# Patient Record
Sex: FEMALE | Employment: FULL TIME | URBAN - NONMETROPOLITAN AREA
[De-identification: names, ages, dates, MRNs, and addresses within clinical notes are randomized per-mention and may not be internally consistent; named-entity substitution may affect disease eponyms.]

---

## 2023-04-20 ENCOUNTER — ANESTHESIA EVENT (OUTPATIENT)
Age: 33
End: 2023-04-20
Payer: OTHER GOVERNMENT

## 2023-05-05 ENCOUNTER — ANESTHESIA (OUTPATIENT)
Age: 33
End: 2023-05-05
Payer: OTHER GOVERNMENT

## 2023-05-05 ENCOUNTER — APPOINTMENT (OUTPATIENT)
Age: 33
End: 2023-05-05
Attending: PODIATRIST
Payer: OTHER GOVERNMENT

## 2023-05-05 ENCOUNTER — HOSPITAL ENCOUNTER (OUTPATIENT)
Age: 33
Setting detail: OUTPATIENT SURGERY
Discharge: HOME | End: 2023-05-05
Attending: PODIATRIST | Admitting: PODIATRIST
Payer: OTHER GOVERNMENT

## 2023-05-05 VITALS
HEIGHT: 67 IN | HEART RATE: 76 BPM | RESPIRATION RATE: 16 BRPM | DIASTOLIC BLOOD PRESSURE: 75 MMHG | TEMPERATURE: 98 F | SYSTOLIC BLOOD PRESSURE: 122 MMHG | WEIGHT: 206 LBS | OXYGEN SATURATION: 99 % | BODY MASS INDEX: 32.33 KG/M2

## 2023-05-05 DIAGNOSIS — G89.18 POST-OP PAIN: Primary | ICD-10-CM

## 2023-05-05 PROCEDURE — C1769 GUIDE WIRE: HCPCS | Performed by: PODIATRIST

## 2023-05-05 PROCEDURE — 6370000000 HC RX 637 (ALT 250 FOR IP): Performed by: NURSE ANESTHETIST, CERTIFIED REGISTERED

## 2023-05-05 PROCEDURE — 73620 X-RAY EXAM OF FOOT: CPT

## 2023-05-05 PROCEDURE — 3600000014 HC SURGERY LEVEL 4 ADDTL 15MIN: Performed by: PODIATRIST

## 2023-05-05 PROCEDURE — 2500000003 HC RX 250 WO HCPCS: Performed by: NURSE ANESTHETIST, CERTIFIED REGISTERED

## 2023-05-05 PROCEDURE — 7100000011 HC PHASE II RECOVERY - ADDTL 15 MIN: Performed by: PODIATRIST

## 2023-05-05 PROCEDURE — 2580000003 HC RX 258: Performed by: PODIATRIST

## 2023-05-05 PROCEDURE — 97161 PT EVAL LOW COMPLEX 20 MIN: CPT

## 2023-05-05 PROCEDURE — 2709999900 HC NON-CHARGEABLE SUPPLY: Performed by: PODIATRIST

## 2023-05-05 PROCEDURE — 6360000002 HC RX W HCPCS: Performed by: NURSE ANESTHETIST, CERTIFIED REGISTERED

## 2023-05-05 PROCEDURE — 3700000001 HC ADD 15 MINUTES (ANESTHESIA): Performed by: PODIATRIST

## 2023-05-05 PROCEDURE — 2500000003 HC RX 250 WO HCPCS: Performed by: PODIATRIST

## 2023-05-05 PROCEDURE — 2580000003 HC RX 258: Performed by: NURSE ANESTHETIST, CERTIFIED REGISTERED

## 2023-05-05 PROCEDURE — 3600000004 HC SURGERY LEVEL 4 BASE: Performed by: PODIATRIST

## 2023-05-05 PROCEDURE — 3700000000 HC ANESTHESIA ATTENDED CARE: Performed by: PODIATRIST

## 2023-05-05 PROCEDURE — 2720000010 HC SURG SUPPLY STERILE: Performed by: PODIATRIST

## 2023-05-05 PROCEDURE — 6360000002 HC RX W HCPCS: Performed by: PODIATRIST

## 2023-05-05 PROCEDURE — 7100000010 HC PHASE II RECOVERY - FIRST 15 MIN: Performed by: PODIATRIST

## 2023-05-05 PROCEDURE — C1713 ANCHOR/SCREW BN/BN,TIS/BN: HCPCS | Performed by: PODIATRIST

## 2023-05-05 PROCEDURE — 7100000000 HC PACU RECOVERY - FIRST 15 MIN: Performed by: PODIATRIST

## 2023-05-05 PROCEDURE — 7100000001 HC PACU RECOVERY - ADDTL 15 MIN: Performed by: PODIATRIST

## 2023-05-05 DEVICE — LOCKING SCREWS
Type: IMPLANTABLE DEVICE | Site: FOOT | Status: FUNCTIONAL
Brand: FASTPITCH 2.7MM HIGH PITCH LOCKING SCREW

## 2023-05-05 DEVICE — ANATOMIC TENSION SIDE PLATE
Type: IMPLANTABLE DEVICE | Site: FOOT | Status: FUNCTIONAL
Brand: PLANTARPOWER PLATE

## 2023-05-05 DEVICE — 2.7MM HIGH PITCH LOCKING SCREW - 12MM/14MM
Type: IMPLANTABLE DEVICE | Site: FOOT | Status: FUNCTIONAL
Brand: FASTPITCH

## 2023-05-05 DEVICE — ANATOMIC BIPLANAR IMPLANTS
Type: IMPLANTABLE DEVICE | Site: FOOT | Status: FUNCTIONAL
Brand: LAPIPLASTY SYSTEM 2

## 2023-05-05 DEVICE — IMPLANTABLE DEVICE: Type: IMPLANTABLE DEVICE | Site: FOOT | Status: FUNCTIONAL

## 2023-05-05 DEVICE — DYNAFORCE IS A NITINOL SUPERELASTIC BONE STAPLE FOR USE IN SURGERY OF THE HAND AND FOOT FOR BONE FRAGMENT OSTEOTOMY FIXATION AND JOINT ARTHRODESIS
Type: IMPLANTABLE DEVICE | Site: FOOT | Status: FUNCTIONAL
Brand: DYNAFORCE™

## 2023-05-05 RX ORDER — DEXAMETHASONE SODIUM PHOSPHATE 10 MG/ML
4 INJECTION, SOLUTION INTRAMUSCULAR; INTRAVENOUS
Status: DISCONTINUED | OUTPATIENT
Start: 2023-05-05 | End: 2023-05-05 | Stop reason: HOSPADM

## 2023-05-05 RX ORDER — SODIUM CHLORIDE 0.9 % (FLUSH) 0.9 %
5-40 SYRINGE (ML) INJECTION EVERY 12 HOURS SCHEDULED
Status: DISCONTINUED | OUTPATIENT
Start: 2023-05-05 | End: 2023-05-05 | Stop reason: HOSPADM

## 2023-05-05 RX ORDER — OXYCODONE HYDROCHLORIDE AND ACETAMINOPHEN 5; 325 MG/1; MG/1
1 TABLET ORAL EVERY 6 HOURS PRN
Qty: 15 TABLET | Refills: 0 | OUTPATIENT
Start: 2023-05-05 | End: 2023-05-10

## 2023-05-05 RX ORDER — OXYCODONE HYDROCHLORIDE AND ACETAMINOPHEN 5; 325 MG/1; MG/1
1 TABLET ORAL ONCE
Status: COMPLETED | OUTPATIENT
Start: 2023-05-05 | End: 2023-05-05

## 2023-05-05 RX ORDER — KETOROLAC TROMETHAMINE 30 MG/ML
INJECTION, SOLUTION INTRAMUSCULAR; INTRAVENOUS PRN
Status: DISCONTINUED | OUTPATIENT
Start: 2023-05-05 | End: 2023-05-05 | Stop reason: SDUPTHER

## 2023-05-05 RX ORDER — AMOXICILLIN AND CLAVULANATE POTASSIUM 875; 125 MG/1; MG/1
1 TABLET, FILM COATED ORAL 2 TIMES DAILY
Qty: 20 TABLET | Refills: 0 | OUTPATIENT
Start: 2023-05-05 | End: 2023-05-15

## 2023-05-05 RX ORDER — BUPIVACAINE HYDROCHLORIDE 5 MG/ML
INJECTION, SOLUTION EPIDURAL; INTRACAUDAL PRN
Status: DISCONTINUED | OUTPATIENT
Start: 2023-05-05 | End: 2023-05-05 | Stop reason: ALTCHOICE

## 2023-05-05 RX ORDER — SODIUM CHLORIDE, SODIUM LACTATE, POTASSIUM CHLORIDE, CALCIUM CHLORIDE 600; 310; 30; 20 MG/100ML; MG/100ML; MG/100ML; MG/100ML
INJECTION, SOLUTION INTRAVENOUS CONTINUOUS
Status: DISCONTINUED | OUTPATIENT
Start: 2023-05-05 | End: 2023-05-05 | Stop reason: HOSPADM

## 2023-05-05 RX ORDER — ONDANSETRON 2 MG/ML
INJECTION INTRAMUSCULAR; INTRAVENOUS PRN
Status: DISCONTINUED | OUTPATIENT
Start: 2023-05-05 | End: 2023-05-05 | Stop reason: SDUPTHER

## 2023-05-05 RX ORDER — ASPIRIN 325 MG
325 TABLET, DELAYED RELEASE (ENTERIC COATED) ORAL DAILY
Qty: 21 TABLET | Refills: 0 | OUTPATIENT
Start: 2023-05-05 | End: 2023-05-26

## 2023-05-05 RX ORDER — EPHEDRINE SULFATE/0.9% NACL/PF 50 MG/5 ML
SYRINGE (ML) INTRAVENOUS PRN
Status: DISCONTINUED | OUTPATIENT
Start: 2023-05-05 | End: 2023-05-05 | Stop reason: SDUPTHER

## 2023-05-05 RX ORDER — FENTANYL CITRATE 50 UG/ML
50 INJECTION, SOLUTION INTRAMUSCULAR; INTRAVENOUS EVERY 5 MIN PRN
Status: DISCONTINUED | OUTPATIENT
Start: 2023-05-05 | End: 2023-05-05 | Stop reason: HOSPADM

## 2023-05-05 RX ORDER — PROPOFOL 10 MG/ML
INJECTION, EMULSION INTRAVENOUS PRN
Status: DISCONTINUED | OUTPATIENT
Start: 2023-05-05 | End: 2023-05-05 | Stop reason: SDUPTHER

## 2023-05-05 RX ORDER — ONDANSETRON 2 MG/ML
4 INJECTION INTRAMUSCULAR; INTRAVENOUS
Status: DISCONTINUED | OUTPATIENT
Start: 2023-05-05 | End: 2023-05-05 | Stop reason: HOSPADM

## 2023-05-05 RX ADMIN — CEFAZOLIN 2000 MG: 2 INJECTION, POWDER, FOR SOLUTION INTRAMUSCULAR; INTRAVENOUS at 08:26

## 2023-05-05 RX ADMIN — ONDANSETRON 4 MG: 2 INJECTION INTRAMUSCULAR; INTRAVENOUS at 08:26

## 2023-05-05 RX ADMIN — KETOROLAC TROMETHAMINE 30 MG: 30 INJECTION, SOLUTION INTRAMUSCULAR at 10:46

## 2023-05-05 RX ADMIN — OXYCODONE AND ACETAMINOPHEN 1 TABLET: 5; 325 TABLET ORAL at 12:28

## 2023-05-05 RX ADMIN — SODIUM CHLORIDE, POTASSIUM CHLORIDE, SODIUM LACTATE AND CALCIUM CHLORIDE: 600; 310; 30; 20 INJECTION, SOLUTION INTRAVENOUS at 08:20

## 2023-05-05 RX ADMIN — SODIUM CHLORIDE, POTASSIUM CHLORIDE, SODIUM LACTATE AND CALCIUM CHLORIDE: 600; 310; 30; 20 INJECTION, SOLUTION INTRAVENOUS at 07:09

## 2023-05-05 RX ADMIN — PROPOFOL 200 MG: 10 INJECTION, EMULSION INTRAVENOUS at 08:24

## 2023-05-05 RX ADMIN — Medication 10 MG: at 08:39

## 2023-05-05 ASSESSMENT — PAIN SCALES - GENERAL
PAINLEVEL_OUTOF10: 4
PAINLEVEL_OUTOF10: 2
PAINLEVEL_OUTOF10: 4
PAINLEVEL_OUTOF10: 3

## 2023-05-05 ASSESSMENT — PAIN DESCRIPTION - PAIN TYPE: TYPE: SURGICAL PAIN

## 2023-05-05 ASSESSMENT — PAIN DESCRIPTION - DESCRIPTORS
DESCRIPTORS: ACHING

## 2023-05-05 ASSESSMENT — PAIN DESCRIPTION - ORIENTATION
ORIENTATION: LEFT

## 2023-05-05 ASSESSMENT — PAIN DESCRIPTION - LOCATION
LOCATION: FOOT

## 2023-05-05 ASSESSMENT — PAIN - FUNCTIONAL ASSESSMENT: PAIN_FUNCTIONAL_ASSESSMENT: NONE - DENIES PAIN

## 2023-05-05 ASSESSMENT — PAIN DESCRIPTION - FREQUENCY: FREQUENCY: CONTINUOUS

## 2023-05-05 NOTE — ANESTHESIA PRE PROCEDURE
Department of Anesthesiology  Preprocedure Note       Name:  Jatinder Hoff   Age:  27 y.o.  :  1990                                          MRN:  [id number]         Date:  2023      Surgeon: Geovany Layton):  Corrie Marte DPM    Procedure: Procedure(s):  LEFT FOOT: LAPIPLASTY, SECOND TOE ARTHROPLASTY, SECOND METATARSAL OSTEOTOMY, FIFTH, METATARSAL OSTEOTOMY, POSSIBLE AKIN OSTEOTOMY    Medications prior to admission:   Prior to Admission medications    Medication Sig Start Date End Date Taking? Authorizing Provider   MULTIPLE VITAMIN PO Take 1 tablet by mouth daily   Yes Historical Provider, MD       Current medications:    Current Facility-Administered Medications   Medication Dose Route Frequency Provider Last Rate Last Admin    lactated ringers IV soln infusion   IntraVENous Continuous JEFF Ferreira CRNA 125 mL/hr at 23 0709 New Bag at 23 0709    sodium chloride flush 0.9 % injection 5-40 mL  5-40 mL IntraVENous 2 times per day Flor Pineda APRN - CRNA           Allergies:  No Known Allergies    Problem List:  There is no problem list on file for this patient. Past Medical History:  History reviewed. No pertinent past medical history.     Past Surgical History:        Procedure Laterality Date     SECTION      WISDOM TOOTH EXTRACTION         Social History:    Social History     Tobacco Use    Smoking status: Never    Smokeless tobacco: Never   Substance Use Topics    Alcohol use: Not on file                                Counseling given: Not Answered      Vital Signs (Current):   Vitals:    23 1035 23 0702   BP:  (!) 102/56   Pulse:  66   Resp:  16   Temp:  36.7 °C (98.1 °F)   TempSrc:  Temporal   SpO2:  97%   Weight: 206 lb (93.4 kg)    Height: 5' 7\" (1.702 m)                                               BP Readings from Last 3 Encounters:   23 (!) 102/56       NPO Status: Time of last liquid consumption: 2200                        Time of

## 2023-05-05 NOTE — H&P
Update History & Physical    The patient's History and Physical of PCP was reviewed with the patient and I examined the patient. There was no change. The surgical site was confirmed by the patient and me. Plan: The risks, benefits, expected outcome, and alternative to the recommended procedure have been discussed with the patient. Patient understands and wants to proceed with the procedure.      Electronically signed by Tawanda Roberts DPM on 5/5/2023 at 8:16 AM

## 2023-05-05 NOTE — DISCHARGE INSTRUCTIONS
Keep dressings dry, clean, intact. Remain non-weight bearing to left foot. Ice and elevate left foot. Pain control as needed with percocet. Finish course of augmentin. Take aspirin 325 mg daily for 3 weeks.

## 2023-05-05 NOTE — THERAPY EVALUATION
PHYSICAL THERAPY EVALUATION AND DISCHARGE    Patient: Patrice Khan (33 y.o. female)  Date: 5/5/2023  Physical Therapy Time:   PT Individual Minutes  Time In: 8035  Time Out: 4567  Minutes: 23  Timed Minute Breakdown: All eval    Primary Diagnosis: Acquired hallux valgus of left foot [M20.12]  Hammer toe of left foot [M20.42]  Acquired cavovarus foot deformity, left [M21.6X2]  Tailor's bunionette, left [M21.622] <principal problem not specified>  Present on Admission:  **None**   Procedure(s) (LRB):  LEFT FOOT: LAPIPLASTY, SECOND TOE ARTHROPLASTY, SECOND METATARSAL OSTEOTOMY, FIFTH, METATARSAL OSTEOTOMY, POSSIBLE AKIN OSTEOTOMY (Left) Day of Surgery   Precautions:   Restrictions/Precautions  Restrictions/Precautions: Weight Bearing Lower Extremity Weight Bearing Restrictions  Left Lower Extremity Weight Bearing: Non Weight Bearing  WB Status: NWB LLE    Assessment: Pt is s/p L foot procedure and she is NWB. She performs mobility fairly well and tolerates crutches well. She is able to toilet with CGA and returns to bed. She is left with all needs met and no further questions. Performance Deficits/Impairments: Decreased functional mobility ; Decreased ROM; Decreased ADL status; Decreased strength;Decreased endurance;Decreased balance; Increased pain  Treatment Diagnosis: pain in L foot  Therapy Prognosis: Excellent  Decision Making: Low Complexity  Discharge Recommendations: Home with assist PRN    Conditions Requiring skilled therapeutic intervention:  Performance Deficits/Impairments: Decreased functional mobility ; Decreased ROM; Decreased ADL status; Decreased strength;Decreased endurance;Decreased balance; Increased pain     Evaluation Activity Tolerance:   Activity Tolerance  Activity Tolerance: Patient limited by fatigue    Equipment Recommendations for Discharge:  PT Equipment Recommendations  Crutches: Axillary    AM-PAC  AM-PAC Inpatient Mobility Raw Score : 20; Current research shows that an AM-PAC score of 17 or

## 2023-05-05 NOTE — ANESTHESIA POSTPROCEDURE EVALUATION
Department of Anesthesiology  Postprocedure Note    Patient: Crystal Doe  MRN: [id number]  YOB: 1990  Date of evaluation: 5/5/2023      Procedure Summary     Date: 05/05/23 Room / Location: [facility] MAIN 01 / [facility] MAIN OR    Anesthesia Start: 0820 Anesthesia Stop: 1134    Procedure: LEFT FOOT: LAPIPLASTY, SECOND TOE ARTHROPLASTY, SECOND METATARSAL OSTEOTOMY, FIFTH, METATARSAL OSTEOTOMY, POSSIBLE AKIN OSTEOTOMY (Left: Foot) Diagnosis:       Acquired hallux valgus of left foot      Hammer toe of left foot      Acquired cavovarus foot deformity, left      Tailor's bunionette, left      (Acquired hallux valgus of left foot [M20.12])      (Hammer toe of left foot [M20.42])      (Acquired cavovarus foot deformity, left [M21.6X2])      (Tailor's bunionette, left [M21.622])    Surgeons: Corby Shin DPM Responsible Provider: JEFF Canela CRNA    Anesthesia Type: General ASA Status: 2          Anesthesia Type: General    Lila Phase I: Lila Score: 10    Lila Phase II:        Anesthesia Post Evaluation    Patient location during evaluation: bedside  Patient participation: complete - patient participated  Level of consciousness: awake and alert  Pain score: 0  Airway patency: patent  Nausea & Vomiting: no nausea and no vomiting  Complications: no  Cardiovascular status: hemodynamically stable  Respiratory status: acceptable and room air  Hydration status: euvolemic  Multimodal analgesia pain management approach

## (undated) DEVICE — BANDAGE COMPR W4INXL12FT E DISP ESMARCH EVEN

## (undated) DEVICE — GAUZE,SPONGE,4"X4",16PLY,STRL,LF,10/TRAY: Brand: MEDLINE

## (undated) DEVICE — DRESSING PETRO W3XL8IN OIL EMUL N ADH GZ KNIT IMPREG CELOS

## (undated) DEVICE — NEPTUNE E-SEP SMOKE EVACUATION PENCIL, COATED, 70MM BLADE, PUSH BUTTON SWITCH: Brand: NEPTUNE E-SEP

## (undated) DEVICE — CUFF TRNQT 2 PRT 18X4.125 IN SINGLE BLDR PLC CYL STRL DISP

## (undated) DEVICE — SYRINGE MED 10ML LUERLOCK TIP W/O SFTY DISP

## (undated) DEVICE — ELECTRODE PT RET AD L9FT HI MOIST COND ADH HYDRGEL CORDED

## (undated) DEVICE — TUBING SUCT 10FR MAL ALUM SHFT FN CAP VENT UNIV CONN W/ OBT

## (undated) DEVICE — DRAPE,U/ SHT,SPLIT,PLAS,STERIL: Brand: MEDLINE

## (undated) DEVICE — GLOVE ORANGE PI 7 1/2   MSG9075

## (undated) DEVICE — GLOVE SURG SZ 75 L12IN FNGR THK79MIL GRN LTX FREE

## (undated) DEVICE — BANDAGE COBAN 6 IN WND 6INX5YD FOAM

## (undated) DEVICE — INTENDED FOR TISSUE SEPARATION, AND OTHER PROCEDURES THAT REQUIRE A SHARP SURGICAL BLADE TO PUNCTURE OR CUT.: Brand: BARD-PARKER SAFETY BLADES SIZE 10, STERILE

## (undated) DEVICE — STRAP,POSITIONING,KNEE/BODY,FOAM,4X60": Brand: MEDLINE

## (undated) DEVICE — Device

## (undated) DEVICE — PRECISION THIN (7.0 X 0.38 X 29.5MM)

## (undated) DEVICE — DRAPE,TOP,102X53,STERILE: Brand: MEDLINE

## (undated) DEVICE — APPLICATOR MEDICATED 26 CC SOLUTION HI LT ORNG CHLORAPREP

## (undated) DEVICE — PACK,EXTREMITY III: Brand: MEDLINE

## (undated) DEVICE — INTENDED FOR TISSUE SEPARATION, AND OTHER PROCEDURES THAT REQUIRE A SHARP SURGICAL BLADE TO PUNCTURE OR CUT.: Brand: BARD-PARKER SAFETY BLADES SIZE 15, STERILE

## (undated) DEVICE — PAD,ABDOMINAL,5"X9",STERILE,LF,1/PK: Brand: MEDLINE INDUSTRIES, INC.

## (undated) DEVICE — INTENDED FOR TISSUE SEPARATION, AND OTHER PROCEDURES THAT REQUIRE A SHARP SURGICAL BLADE TO PUNCTURE OR CUT.: Brand: BARD-PARKER ® STAINLESS STEEL BLADES

## (undated) DEVICE — GLOVE SURG UNDERGLOVE 7.5 PF BLU BIOGEL PI MIC LF

## (undated) DEVICE — SHEET,DRAPE,70X100,STERILE: Brand: MEDLINE

## (undated) DEVICE — SUTURE VCRL SZ 2-0 L27IN ABSRB UD L26MM SH 1/2 CIR J417H

## (undated) DEVICE — TUBING, SUCTION, 9/32" X 10', STRAIGHT: Brand: MEDLINE

## (undated) DEVICE — DRAPE EQUIP FLROSCP 36X44 IN CVR W/TAPE

## (undated) DEVICE — BASIC SINGLE BASIN-LF: Brand: MEDLINE INDUSTRIES, INC.

## (undated) DEVICE — BANDAGE,GAUZE,BULKEE II,4.5"X4.1YD,STRL: Brand: MEDLINE

## (undated) DEVICE — NEEDLE HYPO 25GA L1.5IN BLU POLYPR HUB S STL REG BVL STR

## (undated) DEVICE — SYRINGE IRRIG 60ML SFT PLIABLE BLB EZ TO GRP 1 HND USE W/

## (undated) DEVICE — 3M™ STERI-DRAPE™ INSTRUMENT POUCH 1018: Brand: STERI-DRAPE™

## (undated) DEVICE — SPONGE LAP W18XL18IN WHT COT 4 PLY FLD STRUNG RADPQ DISP ST 2 PER PACK

## (undated) DEVICE — TOWEL,OR,DSP,ST,BLUE,STD,4/PK,20PK/CS: Brand: MEDLINE

## (undated) DEVICE — GUIDEWIRE NTHRD 0.9X150MM

## (undated) DEVICE — YANKAUER,BULB TIP,W/O VENT,RIGID,STERILE: Brand: MEDLINE

## (undated) DEVICE — GOWN,AURORA,FABRIC-REINFORCED,X-LARGE: Brand: MEDLINE

## (undated) DEVICE — SUTURE PROL SZ 3-0 L30IN NONABSORBABLE BLU L26MM SH 1/2 CIR 8832H

## (undated) DEVICE — COVER,TABLE,HEAVY DUTY,77"X90",STRL: Brand: MEDLINE

## (undated) DEVICE — COVER,MAYO STAND,STERILE: Brand: MEDLINE

## (undated) DEVICE — SUTURE 3-0 VCRL CTD SH-1 J219H

## (undated) DEVICE — COUNTER NDL 40 COUNT HLD 70 FOAM BLK ADH W/ MAG